# Patient Record
(demographics unavailable — no encounter records)

---

## 2025-02-03 NOTE — HISTORY OF PRESENT ILLNESS
[Spouse] : spouse [FreeTextEntry1] : follow-up [de-identified] : GISELA ALTAMIRANO is a 42 year yr old F with Hx of anxiety, asthma, BPD, Celiac, depression, fibromyalgia, GERD, HTN, HLD, pituitary adenoma, RA here today for follow-up for medication renewal. Reports followed by ORTHO, pending spinal surgery 07/25 given spondylolisthesis with possible double hernia repair. Currently on Lexapro 20mg for ~6 months. Patient distressed given loss of dog and mom recently with exacerbated health.

## 2025-02-03 NOTE — PLAN
[FreeTextEntry1] : Chronic medical conditions: anxiety, asthma, BPD, Celiac, depression, fibromyalgia, GERD, HTN, HLD, pituitary adenoma, RA Continue medications. F/u with ORTHO Tox Screen

## 2025-02-03 NOTE — END OF VISIT
[FreeTextEntry3] : I, Claudette Simmons, personally scribed the services dictated to me by Dr. Alex Cutler in this documentation on 02/03/2025.   I, Dr. Alex Cutler, personally performed the services in this documentation on 02/03/2025 for the patient as scribed by Claudette Simmons in my presence. I have reviewed and verified that all the information is accurate and true.

## 2025-07-21 NOTE — REVIEW OF SYSTEMS
[Negative] : Heme/Lymph [Anxiety] : anxiety [Muscle Weakness] : muscle weakness [Back Pain] : back pain [Unsteady Walking] : ataxia

## 2025-07-21 NOTE — ADDENDUM
[FreeTextEntry1] : Documented by Saud Solis acting as a scribe under Dr. Cutler. 7/21/25       All medical record entries made by the Scribe were at my, Dr. Alex Cutler, direction and   personally dictated by me on 7/21/25. I have reviewed the chart and agree that the record   accurately reflects my personal performance of the history, physical exam, assessment and   plan. I have also personally directed, reviewed, and agreed with the chart.

## 2025-07-21 NOTE — END OF VISIT
[Time Spent: ___ minutes] : I have spent [unfilled] minutes of time on the encounter which excludes teaching and separately reported services. [FreeTextEntry2] : I, Saud Solis, am scribing for and in the presence of Dr. Cutler in the following sections HISTORY OF PRESENT ILLNESS, PAST MEDICAL/FAMILY/SOCIAL HISTORY; REVIEW OF SYSTEMS; VITAL SIGNS; PHYSICAL EXAM; ASSESSMENT/PLAN.       I personally performed the services described in the documentation, reviewed the documentation recorded by the scribe in my presence, and it accurately and completely records my words and actions.

## 2025-07-21 NOTE — PHYSICAL EXAM
[Well Nourished] : well nourished [Well Developed] : well developed [Well-Appearing] : well-appearing [Normal Voice/Communication] : normal voice/communication [Normal Sclera/Conjunctiva] : normal sclera/conjunctiva [PERRL] : pupils equal round and reactive to light [EOMI] : extraocular movements intact [Normal Outer Ear/Nose] : the outer ears and nose were normal in appearance [Normal Oropharynx] : the oropharynx was normal [Normal TMs] : both tympanic membranes were normal [No JVD] : no jugular venous distention [No Lymphadenopathy] : no lymphadenopathy [Supple] : supple [Thyroid Normal, No Nodules] : the thyroid was normal and there were no nodules present [No Respiratory Distress] : no respiratory distress  [No Accessory Muscle Use] : no accessory muscle use [Clear to Auscultation] : lungs were clear to auscultation bilaterally [Normal Rate] : normal rate  [Regular Rhythm] : with a regular rhythm [Normal S1, S2] : normal S1 and S2 [No Murmur] : no murmur heard [No Carotid Bruits] : no carotid bruits [No Abdominal Bruit] : a ~M bruit was not heard ~T in the abdomen [No Varicosities] : no varicosities [Pedal Pulses Present] : the pedal pulses are present [No Edema] : there was no peripheral edema [No Palpable Aorta] : no palpable aorta [No Extremity Clubbing/Cyanosis] : no extremity clubbing/cyanosis [Soft] : abdomen soft [Non Tender] : non-tender [Non-distended] : non-distended [No Masses] : no abdominal mass palpated [No HSM] : no HSM [Normal Bowel Sounds] : normal bowel sounds [Normal Posterior Cervical Nodes] : no posterior cervical lymphadenopathy [Normal Anterior Cervical Nodes] : no anterior cervical lymphadenopathy [No CVA Tenderness] : no CVA  tenderness [No Spinal Tenderness] : no spinal tenderness [No Joint Swelling] : no joint swelling [Grossly Normal Strength/Tone] : grossly normal strength/tone [No Rash] : no rash [Coordination Grossly Intact] : coordination grossly intact [No Focal Deficits] : no focal deficits [Normal Gait] : normal gait [Deep Tendon Reflexes (DTR)] : deep tendon reflexes were 2+ and symmetric [Speech Grossly Normal] : speech grossly normal [Memory Grossly Normal] : memory grossly normal [Normal Affect] : the affect was normal [Alert and Oriented x3] : oriented to person, place, and time [Normal Insight/Judgement] : insight and judgment were intact [Normal Mood] : the mood was normal

## 2025-07-21 NOTE — HISTORY OF PRESENT ILLNESS
[No Pertinent Cardiac History] : no history of aortic stenosis, atrial fibrillation, coronary artery disease, recent myocardial infarction, or implantable device/pacemaker [Asthma] : asthma [No Adverse Anesthesia Reaction] : no adverse anesthesia reaction in self or family member [(Patient denies any chest pain, claudication, dyspnea on exertion, orthopnea, palpitations or syncope)] : Patient denies any chest pain, claudication, dyspnea on exertion, orthopnea, palpitations or syncope [Aortic Stenosis] : no aortic stenosis [Atrial Fibrillation] : no atrial fibrillation [Coronary Artery Disease] : no coronary artery disease [Recent Myocardial Infarction] : no recent myocardial infarction [Implantable Device/Pacemaker] : no implantable device/pacemaker [COPD] : no COPD [Sleep Apnea] : no sleep apnea [Smoker] : not a smoker [Chronic Anticoagulation] : no chronic anticoagulation [Chronic Kidney Disease] : no chronic kidney disease [Diabetes] : no diabetes [FreeTextEntry1] : Spinal L5-S1 Fusion Surgery [FreeTextEntry2] : 7/28/25 [FreeTextEntry3] : Dr. Verde [FreeTextEntry4] :  Pt. is a 42 y/o female presenting for medical clearance.

## 2025-07-21 NOTE — ASSESSMENT
[Patient Optimized for Surgery] : Patient optimized for surgery [No Further Testing Recommended] : no further testing recommended [Modify medications prior to procedure] : Modify medications prior to procedure [FreeTextEntry4] : cleared for Surgery  [FreeTextEntry7] : Stop water pill day of the surgery. Stop all vitamins a week before the surgery.